# Patient Record
Sex: FEMALE | Race: WHITE
[De-identification: names, ages, dates, MRNs, and addresses within clinical notes are randomized per-mention and may not be internally consistent; named-entity substitution may affect disease eponyms.]

---

## 2018-12-28 ENCOUNTER — HOSPITAL ENCOUNTER (OUTPATIENT)
Dept: HOSPITAL 61 - US | Age: 33
Discharge: HOME | End: 2018-12-28
Attending: NURSE PRACTITIONER
Payer: COMMERCIAL

## 2018-12-28 DIAGNOSIS — R16.0: Primary | ICD-10-CM

## 2018-12-28 PROCEDURE — 76705 ECHO EXAM OF ABDOMEN: CPT

## 2018-12-28 NOTE — RAD
Limited abdominal ultrasound 12/28/2018

 

INDICATION: Right upper quadrant pain.

 

COMPARISON STUDY: None

 

Discussion: Ultrasound evaluation of the right upper quadrant was 

performed. Static images are sent to PACS. Visualized portions of the 

pancreas are unremarkable. Visualized portions of the gallbladder 

unremarkable without evidence of wall thickening, stones, or sludge. No 

pericholecystic fluid is identified. The common bile duct is nondilated 

measuring between 1 and 2 mm in diameter. The liver is grossly normal in 

echotexture. No focal hepatic lesions are seen. Portal venous flow appears

to be in the normal direction. Liver is mildly enlarged measuring 19 cm 

longitudinally. The right kidney is unremarkable in appearance measuring 

10.6 cm in length. Visualized aorta and IVC are unremarkable.

 

IMPRESSION:

1. Mild hepatomegaly, nonspecific.

2. Otherwise unremarkable sonographic appearance of the right upper 

quadrant.

 

Electronically signed by: Juan Zhou MD (12/28/2018 12:59 PM) [id number]